# Patient Record
Sex: FEMALE | Race: ASIAN
[De-identification: names, ages, dates, MRNs, and addresses within clinical notes are randomized per-mention and may not be internally consistent; named-entity substitution may affect disease eponyms.]

---

## 2022-01-13 ENCOUNTER — APPOINTMENT (OUTPATIENT)
Dept: OBGYN | Facility: CLINIC | Age: 47
End: 2022-01-13
Payer: COMMERCIAL

## 2022-01-13 VITALS
SYSTOLIC BLOOD PRESSURE: 111 MMHG | BODY MASS INDEX: 33.72 KG/M2 | WEIGHT: 209.8 LBS | HEIGHT: 66 IN | HEART RATE: 78 BPM | DIASTOLIC BLOOD PRESSURE: 74 MMHG

## 2022-01-13 DIAGNOSIS — Z01.419 ENCOUNTER FOR GYNECOLOGICAL EXAMINATION (GENERAL) (ROUTINE) W/OUT ABNORMAL FINDINGS: ICD-10-CM

## 2022-01-13 PROBLEM — Z00.00 ENCOUNTER FOR PREVENTIVE HEALTH EXAMINATION: Status: ACTIVE | Noted: 2022-01-13

## 2022-01-13 PROCEDURE — 99386 PREV VISIT NEW AGE 40-64: CPT

## 2022-01-20 ENCOUNTER — APPOINTMENT (OUTPATIENT)
Dept: OBGYN | Facility: CLINIC | Age: 47
End: 2022-01-20

## 2022-07-29 ENCOUNTER — APPOINTMENT (OUTPATIENT)
Dept: GASTROENTEROLOGY | Facility: CLINIC | Age: 47
End: 2022-07-29

## 2022-07-29 VITALS
OXYGEN SATURATION: 98 % | WEIGHT: 196 LBS | BODY MASS INDEX: 31.5 KG/M2 | DIASTOLIC BLOOD PRESSURE: 75 MMHG | SYSTOLIC BLOOD PRESSURE: 112 MMHG | HEIGHT: 66 IN | HEART RATE: 66 BPM

## 2022-07-29 DIAGNOSIS — Z86.39 PERSONAL HISTORY OF OTHER ENDOCRINE, NUTRITIONAL AND METABOLIC DISEASE: ICD-10-CM

## 2022-07-29 PROCEDURE — 99204 OFFICE O/P NEW MOD 45 MIN: CPT

## 2022-07-29 NOTE — ASSESSMENT
[FreeTextEntry1] : 46 yo female with past medical hx of iron deficiency anemia presents today with reflux symptoms and bloating.\par \par \par Plan \par Problem 1: GERD \par - pt has hx of dyspepsia/GERD \par - takes Nexium at home \par - recent endoscopy in [place] showed mild gastritis \par - will repeat endoscopy \par - will perform H. pylori screening \par - will switch patient to Pepcid \par - discontinue Nexium \par \par Problem 2: Bloating/Abdominal discomfort \par - pt feels as though she has been experiencing increased bloating \par - no prior colonoscopy performed \par - will perform colonoscopy \par - Pre-op procedure discussed with patient \par - Risks and benefits of procedure discussed with patient \par \par written by Mallory Roper (PGY1 FM Resident) \par \par Reviewed and reconciled medications, allergies, PMHx, PSHx, SocHx, FMHx. Reviewed imaging, blood work, diagnostic testing, discussed with patient. Further recommendations pending results of above work up and evaluation. Patient seen and examined with Dr Roper. Plan devised and discussed with her, patient and family. \par

## 2022-07-29 NOTE — HISTORY OF PRESENT ILLNESS
[de-identified] : 48 yo female presenting today as a new pt. Recently immigrated from \par [place] and desires to establish care. She has had hx of acid reflux which does not seem to be resolving along with feelings of bloating. Pt states that the reflux is largely brought on by protein, fatty, and spicy foods. Takes Nexium at home which helps to alleviate the symptoms. Pt had an endoscopy performed in [place] which showed mild gastritis. Admits occasional loose bowel movements. Denies nausea, vomiting, constipation, melena. \par SDR with CRC (paternal uncles) and FDR with ulcers

## 2022-07-29 NOTE — REVIEW OF SYSTEMS
[Heartburn] : heartburn [Negative] : Heme/Lymph [Abdominal Pain] : no abdominal pain [Vomiting] : no vomiting [Constipation] : no constipation [Diarrhea] : no diarrhea [Melena] : no melena [FreeTextEntry7] : occasional heart burn

## 2022-07-29 NOTE — PHYSICAL EXAM
[General Appearance - Alert] : alert [General Appearance - Well Nourished] : well nourished [General Appearance - Well-Appearing] : healthy appearing [Bowel Sounds] : normal bowel sounds [Abdomen Soft] : soft [Abdomen Tenderness] : non-tender [Abdomen Mass (___ Cm)] : no abdominal mass palpated [Abnormal Walk] : normal gait [Nail Clubbing] : no clubbing  or cyanosis of the fingernails [Musculoskeletal - Swelling] : no joint swelling seen [Motor Tone] : muscle strength and tone were normal [Skin Color & Pigmentation] : normal skin color and pigmentation [Skin Turgor] : normal skin turgor [] : no rash

## 2022-07-29 NOTE — REASON FOR VISIT
[Initial Evaluation] : an initial evaluation [Spouse] : spouse [FreeTextEntry1] : Pt has been having reflux symptoms and bloating. abdominal pain/ burning, dyspepsia. fecal urgency

## 2022-08-10 ENCOUNTER — APPOINTMENT (OUTPATIENT)
Dept: GASTROENTEROLOGY | Facility: AMBULATORY MEDICAL SERVICES | Age: 47
End: 2022-08-10

## 2022-10-24 ENCOUNTER — APPOINTMENT (OUTPATIENT)
Dept: GASTROENTEROLOGY | Facility: CLINIC | Age: 47
End: 2022-10-24

## 2022-10-24 VITALS
HEART RATE: 66 BPM | SYSTOLIC BLOOD PRESSURE: 118 MMHG | WEIGHT: 182 LBS | HEIGHT: 66 IN | DIASTOLIC BLOOD PRESSURE: 79 MMHG | OXYGEN SATURATION: 97 % | BODY MASS INDEX: 29.25 KG/M2

## 2022-10-24 PROCEDURE — 99215 OFFICE O/P EST HI 40 MIN: CPT

## 2022-10-25 NOTE — REASON FOR VISIT
[Spouse] : spouse [FreeTextEntry1] : Pt has been having reflux symptoms and bloating. abdominal pain/ burning, dyspepsia. fecal urgency

## 2022-10-25 NOTE — HISTORY OF PRESENT ILLNESS
[de-identified] : interval history: no changes in symptoms. she has significantly limited her diet and food choices. reports that her limited diet is difficult and contributing to her feeling weak/ low enegery. \par NPA 7/29/22: 48 yo female presenting today as a new pt. Recently immigrated from \par [facility] and desires to establish care. She has had hx of acid reflux which does not seem to be resolving along with feelings of bloating. Pt states that the reflux is largely brought on by protein, fatty, and spicy foods. Takes Nexium at home which helps to alleviate the symptoms. Pt had an endoscopy performed in [place] which showed mild gastritis. Admits occasional loose bowel movements. Denies nausea, vomiting, constipation, melena. \par SDR with CRC (paternal uncles) and FDR with ulcers

## 2022-10-25 NOTE — CONSULT LETTER
[Dear  ___] : Dear  [unfilled], [Courtesy Letter:] : I had the pleasure of seeing your patient, [unfilled], in my office today. [Consult Closing:] : Thank you very much for allowing me to participate in the care of this patient.  If you have any questions, please do not hesitate to contact me. [Sincerely,] : Sincerely, [FreeTextEntry1] : 46 yo female with iron deficiency anemia following up today for abdominal pain, reflux symptoms and bloating.\par She will have EGD and colonoscopy to further evaluate. I explained to the patient the risks, alternatives and benefits to a EGD/ colonoscopy. Risk including but not limited to bleeding, perforation, infection adverse medication reaction. Questions were answered. agreeable to proceed with the planned procedures. \par \par \par \par Reviewed and reconciled medications, allergies, PMHx, PSHx, SocHx, FMHx. Reviewed imaging, blood work, diagnostic testing, discussed with patient. Further recommendations pending results of above work up and evaluation. Patient seen and examined with Dr Roper. Plan devised and discussed with her, patient and family. \par  [FreeTextEntry3] : Evangelina Horton MD\par Gastroenterology, Hepatology and Motility\par

## 2022-10-25 NOTE — ASSESSMENT
[FreeTextEntry1] : 48 yo female with iron deficiency anemia following up today for abdominal pain, reflux symptoms and bloating.\par She will have EGD and colonoscopy to further evaluate. I explained to the patient the risks, alternatives and benefits to a EGD/ colonoscopy. Risk including but not limited to bleeding, perforation, infection adverse medication reaction. Questions were answered. agreeable to proceed with the planned procedures. \par \par \par \par Reviewed and reconciled medications, allergies, PMHx, PSHx, SocHx, FMHx. Reviewed imaging, blood work, diagnostic testing, discussed with patient. Further recommendations pending results of above work up and evaluation. Patient seen and examined with Dr Roper. Plan devised and discussed with her, patient and family. \par

## 2022-10-28 ENCOUNTER — LABORATORY RESULT (OUTPATIENT)
Age: 47
End: 2022-10-28

## 2022-11-01 ENCOUNTER — APPOINTMENT (OUTPATIENT)
Dept: GASTROENTEROLOGY | Facility: AMBULATORY MEDICAL SERVICES | Age: 47
End: 2022-11-01

## 2022-11-01 ENCOUNTER — RESULT REVIEW (OUTPATIENT)
Age: 47
End: 2022-11-01

## 2022-11-01 PROCEDURE — 45380 COLONOSCOPY AND BIOPSY: CPT

## 2022-11-01 PROCEDURE — 43239 EGD BIOPSY SINGLE/MULTIPLE: CPT | Mod: 59

## 2022-11-03 ENCOUNTER — RX RENEWAL (OUTPATIENT)
Age: 47
End: 2022-11-03

## 2022-11-04 RX ORDER — BISMUTH SUBCITRATE POTASSIUM, METRONIDAZOLE, AND TETRACYCLINE HYDROCHLORIDE 140; 125; 125 MG/1; MG/1; MG/1
140-125-125 CAPSULE ORAL
Qty: 54 | Refills: 0 | Status: ACTIVE | COMMUNITY
Start: 2022-11-04 | End: 1900-01-01

## 2022-11-14 ENCOUNTER — NON-APPOINTMENT (OUTPATIENT)
Age: 47
End: 2022-11-14

## 2022-11-14 ENCOUNTER — APPOINTMENT (OUTPATIENT)
Dept: GASTROENTEROLOGY | Facility: CLINIC | Age: 47
End: 2022-11-14

## 2022-11-14 VITALS
HEIGHT: 66 IN | BODY MASS INDEX: 30.05 KG/M2 | DIASTOLIC BLOOD PRESSURE: 71 MMHG | SYSTOLIC BLOOD PRESSURE: 104 MMHG | OXYGEN SATURATION: 99 % | WEIGHT: 187 LBS

## 2022-11-14 PROCEDURE — 99215 OFFICE O/P EST HI 40 MIN: CPT

## 2022-11-14 NOTE — REASON FOR VISIT
[Follow-up] : a follow-up of an existing diagnosis [FreeTextEntry1] : weight loss, inability to tolerate meals, abdominal pain, fecal urgency. ZABRINA

## 2022-11-14 NOTE — CONSULT LETTER
[Dear  ___] : Dear  [unfilled], [Courtesy Letter:] : I had the pleasure of seeing your patient, [unfilled], in my office today. [Consult Closing:] : Thank you very much for allowing me to participate in the care of this patient.  If you have any questions, please do not hesitate to contact me. [Sincerely,] : Sincerely, [FreeTextEntry1] : We discussed in detail with patient and her family diagnosis of H pylori causing gastric ulceration and the likely etiology of her upper GI symptoms. WE also discussed the potential complications associated with active infection including PUD/ ulcer complications in addition to the association with malignancy. She will take Pylera which was prescribed. R/B/A discussed and questions answered. \par \par also with ulceration of more distal GI tract. concern for AID/ IBD, effects of NSAIDs or infection. will obtain further testing including MRE and inflammatory markers, based on results, capsule endoscopy. \par \par Reviewed and reconciled medications, allergies, PMHx, PSHx, SocHx, FMHx. Reviewed imaging, blood work, diagnostic testing, discussed with patient. Further recommendations pending results of above work up and evaluation.\par \par \par She will follow up after treatment compete. \par  [FreeTextEntry3] : Evangelina Horton MD\par Gastroenterology, Hepatology and Motility\par

## 2022-11-14 NOTE — ASSESSMENT
[FreeTextEntry1] : We discussed in detail with patient and her family diagnosis of H pylori causing gastric ulceration and the likely etiology of her upper GI symptoms. WE also discussed the potential complications associated with active infection including PUD/ ulcer complications in addition to the association with malignancy. She will take Pylera which was prescribed. R/B/A discussed and questions answered. \par \par also with ulceration of more distal GI tract. concern for AID/ IBD, effects of NSAIDs or infection. will obtain further testing including MRE and inflammatory markers, based o nresults, capsule endoscopy. \par \par Reviewed and reconciled medications, allergies, PMHx, PSHx, SocHx, FMHx. Reviewed imaging, blood work, diagnostic testing, discussed with patient. Further recommendations pending results of above work up and evaluation.\par \par \par She will follow up after treatment complte. \par

## 2022-11-14 NOTE — HISTORY OF PRESENT ILLNESS
[FreeTextEntry1] : gastric biopsies showed h pylori infection. also with inflammation of TI and right colon. \par interval history: no changes in symptoms. she has significantly limited her diet and food choices. reports that her limited diet is difficult and contributing to her feeling weak/ low enegery. \par NPA 7/29/22: 48 yo female presenting today as a new pt. Recently immigrated from \par [place] and desires to establish care. She has had hx of acid reflux which does not seem to be resolving along with feelings of bloating. Pt states that the reflux is largely brought on by protein, fatty, and spicy foods. Takes Nexium at home which helps to alleviate the symptoms. Pt had an endoscopy performed in [place] which showed mild gastritis. Admits occasional loose bowel movements. Denies nausea, vomiting, constipation, melena. \par SDR with CRC (paternal uncles) and FDR with ulcers.

## 2022-11-14 NOTE — PHYSICAL EXAM
[Sclera] : the sclera and conjunctiva were normal [No Respiratory Distress] : no respiratory distress [Abdomen Soft] : soft

## 2022-11-18 RX ORDER — OMEPRAZOLE 20 MG/1
20 TABLET, DELAYED RELEASE ORAL
Qty: 28 | Refills: 0 | Status: ACTIVE | COMMUNITY
Start: 2022-11-18 | End: 1900-01-01

## 2022-12-19 ENCOUNTER — APPOINTMENT (OUTPATIENT)
Dept: GASTROENTEROLOGY | Facility: CLINIC | Age: 47
End: 2022-12-19

## 2022-12-19 PROCEDURE — 99214 OFFICE O/P EST MOD 30 MIN: CPT

## 2022-12-20 NOTE — REASON FOR VISIT
[Follow-up] : a follow-up of an existing diagnosis [Spouse] : spouse [FreeTextEntry1] : weight loss, inability to tolerate meals, abdominal pain, fecal urgency. ZABRINA

## 2022-12-20 NOTE — CONSULT LETTER
[Dear  ___] : Dear  [unfilled], [Courtesy Letter:] : I had the pleasure of seeing your patient, [unfilled], in my office today. [Consult Closing:] : Thank you very much for allowing me to participate in the care of this patient.  If you have any questions, please do not hesitate to contact me. [Sincerely,] : Sincerely, [FreeTextEntry1] : Adelina is a very pleasant 47-year-old female who presents for follow-up today along with her  and her daughter.  She has completed the Pylera and despite having a lot of difficulty with the medication in particular nausea and poor appetite, she now is starting to feel better.  Prior to starting the Pylera and being diagnosed with H. pylori, she had significant abdominal pain and was eating very little (limited choices of food).  This has improved.\par We discussed in detail with patient and her family diagnosis of H pylori causing gastric ulceration and the likely etiology of her upper GI symptoms. WE also discussed the potential complications associated with chronic infection including PUD/ ulcer complications in addition to the association with malignancy. Patient completed taking Pylera. she will need confirmation of eradication. advised to avoid PPI, antibiotics and PeptoBismol for 2 weeks prior to UBT. \par \par also with ulceration of more distal GI tract. concern for AID/ IBD, effects of NSAIDs or infection. will obtain further testing including MRE and inflammatory markers, based o nresults, capsule endoscopy. \par \par Reviewed and reconciled medications, allergies, PMHx, PSHx, SocHx, FMHx. Reviewed imaging, blood work, diagnostic testing, discussed with patient. Further recommendations pending results of above work up and evaluation.\par  [FreeTextEntry3] : Evangelina Horton MD\par Gastroenterology, Hepatology and Motility\par

## 2022-12-20 NOTE — ASSESSMENT
[FreeTextEntry1] : Adelina is a very pleasant 47-year-old female who presents for follow-up today along with her  and her daughter.  She has completed the Pylera and despite having a lot of difficulty with the medication in particular nausea and poor appetite, she now is starting to feel better.  Prior to starting the Pylera and being diagnosed with H. pylori, she had significant abdominal pain and was eating very little (limited choices of food).  This has improved.\par We discussed in detail with patient and her family diagnosis of H pylori causing gastric ulceration and the likely etiology of her upper GI symptoms. WE also discussed the potential complications associated with chronic infection including PUD/ ulcer complications in addition to the association with malignancy. Patient completed taking Pylera. she will need confirmation of eradication. advised to avoid PPI, antibiotics and PeptoBismol for 2 weeks prior to UBT. \par \par also with ulceration of more distal GI tract. concern for AID/ IBD, effects of NSAIDs or infection. will obtain further testing including MRE and inflammatory markers, based o nresults, capsule endoscopy. \par \par Reviewed and reconciled medications, allergies, PMHx, PSHx, SocHx, FMHx. Reviewed imaging, blood work, diagnostic testing, discussed with patient. Further recommendations pending results of above work up and evaluation.\par \par \par \par

## 2022-12-20 NOTE — PHYSICAL EXAM
[Sclera] : the sclera and conjunctiva were normal [No Respiratory Distress] : no respiratory distress [Abdomen Soft] : soft [Abdomen Tenderness] : non-tender [No Masses] : no abdominal mass palpated

## 2022-12-20 NOTE — HISTORY OF PRESENT ILLNESS
[FreeTextEntry1] : gastric biopsies showed h pylori infection. also with inflammation of TI and right colon. \par interval history: no changes in symptoms. she has significantly limited her diet and food choices. reports that her limited diet is difficult and contributing to her feeling weak/ low energy. \par NPA 7/29/22: 48 yo female presenting today as a new pt. Recently immigrated from \par [place] and desires to establish care. She has had hx of acid reflux which does not seem to be resolving along with feelings of bloating. Pt states that the reflux is largely brought on by protein, fatty, and spicy foods. Takes Nexium at home which helps to alleviate the symptoms. Pt had an endoscopy performed in [place] which showed mild gastritis. Admits occasional loose bowel movements. Denies nausea, vomiting, constipation, melena. \par SDR with CRC (paternal uncles) and FDR with ulcers. \par \par Reports today that her upper GI symptoms are much better and she felt the worst while she was actually taking the Pylera. She has been able to eat more. She has had ZABRINA, menstrual periods normal.

## 2023-04-03 ENCOUNTER — APPOINTMENT (OUTPATIENT)
Dept: ORTHOPEDIC SURGERY | Facility: CLINIC | Age: 48
End: 2023-04-03
Payer: COMMERCIAL

## 2023-04-03 PROCEDURE — 73630 X-RAY EXAM OF FOOT: CPT | Mod: LT

## 2023-04-03 PROCEDURE — 99203 OFFICE O/P NEW LOW 30 MIN: CPT

## 2023-04-03 RX ORDER — DICLOFENAC SODIUM 1% 10 MG/G
1 GEL TOPICAL
Qty: 1 | Refills: 2 | Status: ACTIVE | COMMUNITY
Start: 2023-04-03 | End: 1900-01-01

## 2023-05-22 ENCOUNTER — APPOINTMENT (OUTPATIENT)
Dept: ORTHOPEDIC SURGERY | Facility: CLINIC | Age: 48
End: 2023-05-22
Payer: COMMERCIAL

## 2023-05-22 VITALS
TEMPERATURE: 97.7 F | HEART RATE: 83 BPM | DIASTOLIC BLOOD PRESSURE: 67 MMHG | SYSTOLIC BLOOD PRESSURE: 107 MMHG | HEIGHT: 66 IN | BODY MASS INDEX: 28.93 KG/M2 | WEIGHT: 180 LBS | OXYGEN SATURATION: 99 %

## 2023-05-22 DIAGNOSIS — M21.41 FLAT FOOT [PES PLANUS] (ACQUIRED), RIGHT FOOT: ICD-10-CM

## 2023-05-22 DIAGNOSIS — L84 CORNS AND CALLOSITIES: ICD-10-CM

## 2023-05-22 DIAGNOSIS — M62.89 OTHER SPECIFIED DISORDERS OF MUSCLE: ICD-10-CM

## 2023-05-22 DIAGNOSIS — M21.42 FLAT FOOT [PES PLANUS] (ACQUIRED), RIGHT FOOT: ICD-10-CM

## 2023-05-22 PROCEDURE — 99213 OFFICE O/P EST LOW 20 MIN: CPT

## 2023-05-23 PROBLEM — M62.89 TIGHTNESS OF BOTH GASTROCNEMIUS MUSCLES: Status: ACTIVE | Noted: 2023-04-29

## 2023-05-23 PROBLEM — M21.41 ACQUIRED PES PLANUS OF BOTH FEET: Status: ACTIVE | Noted: 2023-04-29

## 2023-05-23 PROBLEM — L84 SKIN CALLUS: Status: ACTIVE | Noted: 2023-04-29

## 2023-06-23 ENCOUNTER — APPOINTMENT (OUTPATIENT)
Dept: GASTROENTEROLOGY | Facility: CLINIC | Age: 48
End: 2023-06-23
Payer: COMMERCIAL

## 2023-06-23 VITALS
HEART RATE: 68 BPM | DIASTOLIC BLOOD PRESSURE: 69 MMHG | SYSTOLIC BLOOD PRESSURE: 107 MMHG | OXYGEN SATURATION: 97 % | WEIGHT: 184 LBS | HEIGHT: 66 IN | BODY MASS INDEX: 29.57 KG/M2

## 2023-06-23 DIAGNOSIS — K27.9 PEPTIC ULCER, SITE UNSPECIFIED, UNSPECIFIED AS ACUTE OR CHRONIC, W/OUT HEMORRHAGE OR PERFORATION: ICD-10-CM

## 2023-06-23 DIAGNOSIS — M20.21 HALLUX RIGIDUS, RIGHT FOOT: ICD-10-CM

## 2023-06-23 DIAGNOSIS — D64.9 ANEMIA, UNSPECIFIED: ICD-10-CM

## 2023-06-23 DIAGNOSIS — R14.0 ABDOMINAL DISTENSION (GASEOUS): ICD-10-CM

## 2023-06-23 DIAGNOSIS — D50.0 IRON DEFICIENCY ANEMIA SECONDARY TO BLOOD LOSS (CHRONIC): ICD-10-CM

## 2023-06-23 DIAGNOSIS — K52.9 NONINFECTIVE GASTROENTERITIS AND COLITIS, UNSPECIFIED: ICD-10-CM

## 2023-06-23 DIAGNOSIS — B96.81 PEPTIC ULCER, SITE UNSPECIFIED, UNSPECIFIED AS ACUTE OR CHRONIC, W/OUT HEMORRHAGE OR PERFORATION: ICD-10-CM

## 2023-06-23 DIAGNOSIS — R13.10 DYSPHAGIA, UNSPECIFIED: ICD-10-CM

## 2023-06-23 DIAGNOSIS — R10.13 EPIGASTRIC PAIN: ICD-10-CM

## 2023-06-23 PROCEDURE — 99215 OFFICE O/P EST HI 40 MIN: CPT

## 2023-06-23 RX ORDER — SODIUM SULFATE, MAGNESIUM SULFATE, AND POTASSIUM CHLORIDE 17.75; 2.7; 2.25 G/1; G/1; G/1
1479-225-188 TABLET ORAL
Qty: 1 | Refills: 0 | Status: DISCONTINUED | COMMUNITY
Start: 2022-07-29 | End: 2023-06-23

## 2023-06-24 LAB
CRP SERPL-MCNC: <3 MG/L
ERYTHROCYTE [SEDIMENTATION RATE] IN BLOOD BY WESTERGREN METHOD: 8 MM/HR
FOLATE SERPL-MCNC: 13.2 NG/ML
VIT B12 SERPL-MCNC: 432 PG/ML

## 2023-07-02 ENCOUNTER — NON-APPOINTMENT (OUTPATIENT)
Age: 48
End: 2023-07-02

## 2023-07-12 LAB
ANTI-A4 FLA2 IGG ELISA: 11.8 EU/ML
ANTI-CBIR1 ELISA: 19 EU/ML
ANTI-FLAX IGG ELISA: 10 EU/ML
ANTI-OMPC IGA ELISA: < 3.1 EU/ML
ASCA IGA ELISA: < 3.1 EU/ML
ASCA IGG ELISA: < 3.1 EU/ML
ATG16L1 SNP (RS2241880): NORMAL
CRP PROMTHEUS: 1.9 MG/L
ECM1 SNP (RS3737240): NORMAL
IBD AB 7 PNL SER: NORMAL
IBD-SPECIFIC PANCA IFA PATTERN DNASE SENSITIVITY: NOT DETECTED
IBD-SPECIFIC PANCA IFA PERINUCLEAR PATTERN: NOT DETECTED
ICAM-1 PROMETHEUS: 0.45 UG/ML
NKX2-3 SNP (RS10883365): NORMAL
PROMETHEUS LABORATORY FOOTER: NORMAL
SAA PROMETHEUS: 3.1 MG/L
STAT3 SNP (RS744166): NORMAL
VCAM-1 PROMETHEUS: 0.65 UG/ML
VEGF PROMETHEUS: 40 PG/ML

## 2023-08-21 ENCOUNTER — RX RENEWAL (OUTPATIENT)
Age: 48
End: 2023-08-21

## 2023-08-21 RX ORDER — FAMOTIDINE 20 MG/1
20 TABLET, FILM COATED ORAL TWICE DAILY
Qty: 180 | Refills: 2 | Status: ACTIVE | COMMUNITY
Start: 2022-07-29 | End: 1900-01-01

## 2024-02-27 NOTE — REVIEW OF SYSTEMS
"[facility] Daily Progress Note    Denisse Abel is a 67 y.o. female admitted 2/23/2024 with acute abdomen and sepsis due to perforated duodenal ulcer she was taken to the OR immediately and underwent repair of the duodenal ulcer. She originally presented for D/S HF w/ EF 40% and Diastolic Dysfunction Grade II.       2/23 Sx Perforated 1cm duodenal ulcer repair w/ sharlene patch & drain.   2/24 Stable. Mild abdominal pain. - BM. NPO. NG tube in place.   Afebrile. Vitals stable. Labs stable minus Na @ 123, BNP @ 2900.   2/25  Stable, overall doing better. Improvement of abd pain.     2/26 PM Rounds Update  A&Ox4. Stable. Overall doing well.   NGT putting out dark bile. D/Cd NGT in AM and began clear diet; tolerating.   Reporting nausea this AM. - emesis. Improved this PM.  Afebrile. Vitals stable. Labs stable minus RBC/Hg/Hct decreased (3.59, 10, 31.1). Na @ 134 (improved from 128). BUN 39 (improved from 56)  Upper GI series completed today. Results: \"There is no evidence of contrast leak from the stomach or duodenum.  No obstructing or constricting lesion. IMPRESSION: No evidence of contrast leak from the surgical repair site.\"    Plan for today   Pt is adamant about D/C to home; Monitor and evaluate in AM.        Will continue to follow. Thank you for allowing me to participate in their care.     Josep Pacheco, Ph.D. ([facility])  [employer] [facility], MD/PhD Program   [facility] Team     Attending. Dr. Toni Jay        SmartTabs below:     Interval Problem Update  Nausea overnight no vomiting, dark bile out from NGT  Subsequent UGI w/o leak  Per Surgeon (Pierre) OK to DC NGT and start clears    I have discussed this patient's plan of care and discharge plan at IDT rounds today with Case Management, Nursing, Nursing leadership, and other members of the IDT team.    Consultants/Specialty  critical care and general surgery    Code Status  Full Code    Disposition  Medically Cleared  I " have placed the appropriate orders for post-discharge needs.    Review of Systems  Review of Systems   Constitutional:  Positive for weight loss.   Respiratory:  Negative for shortness of breath.    Cardiovascular:  Negative for chest pain.   Gastrointestinal:  Positive for abdominal pain and nausea. Negative for vomiting.   Neurological:  Positive for weakness.   Psychiatric/Behavioral:  The patient is not nervous/anxious.         Physical Exam  Temp:  [36.4 °C (97.5 °F)-37 °C (98.6 °F)] 36.6 °C (97.8 °F)  Pulse:  [] 97  Resp:  [12-17] 16  BP: (106-123)/(48-72) 123/72  SpO2:  [93 %-99 %] 99 %    Physical Exam  Vitals and nursing note reviewed.   Constitutional:       Comments: Very thin, almost cachectic with muscle wasting   HENT:      Head: Normocephalic and atraumatic.      Nose:      Comments: NG tube     Mouth/Throat:      Mouth: Mucous membranes are moist.   Eyes:      Extraocular Movements: Extraocular movements intact.      Pupils: Pupils are equal, round, and reactive to light.   Cardiovascular:      Rate and Rhythm: Normal rate and regular rhythm.   Pulmonary:      Effort: Pulmonary effort is normal.      Breath sounds: Normal breath sounds.   Abdominal:      General: Abdomen is flat. There is no distension.      Palpations: Abdomen is soft.      Tenderness: There is abdominal tenderness.   Musculoskeletal:      Right lower leg: No edema.      Left lower leg: No edema.      Comments: Atrophy   Skin:     Comments: Skin loose   Neurological:      General: No focal deficit present.      Mental Status: She is alert and oriented to person, place, and time.      Cranial Nerves: No cranial nerve deficit.   Psychiatric:      Comments: More calm today         Fluids    Intake/Output Summary (Last 24 hours) at 2/26/2024 1748  Last data filed at 2/26/2024 1320  Gross per 24 hour   Intake 0 ml   Output 3050 ml   Net -3050 ml       Laboratory  Recent Labs     02/26/24  0250   WBC 8.1   RBC 3.59*   HEMOGLOBIN  10.0*   HEMATOCRIT 31.1*   MCV 86.6   MCH 27.9   MCHC 32.2   RDW 85.1*   PLATELETCT 218   MPV 9.9     Recent Labs     02/24/24  0402 02/25/24  0512 02/26/24  0250   SODIUM 123* 128* 134*   POTASSIUM 4.7 4.4 3.9   CHLORIDE 90* 94* 100   CO2 20 21 21   GLUCOSE 126* 104* 80   BUN 67* 56* 39*   CREATININE 1.72* 1.53* 1.01   CALCIUM 9.2 9.9 9.4                     Imaging  DX-UPPER GI-SERIES WITH KUB   Final Result      No evidence of contrast leak from the surgical repair site.      DX-CHEST-LIMITED (1 VIEW)   Final Result      1.  Free intraperitoneal air consistent with perforation of hollow viscus      2.  Findings were discussed with JUAN PABLO SCHWARTZ on 2/23/2024 11:42 AM.      CT-RENAL COLIC EVALUATION(A/P W/O)   Final Result      1.  Free intraperitoneal air present consistent with perforation of a hollow viscus      2.  Specific site of perforation is not evident on CT      3.  Cirrhosis with portal hypertension      4.  Moderate amount of ascites      5.  Cholelithiasis            6.  Findings were discussed with JUAN PABLO SCHWARTZ on 2/23/2024 11:45 AM.                     [As Noted in HPI] : as noted in HPI

## 2024-06-12 ENCOUNTER — APPOINTMENT (OUTPATIENT)
Dept: GASTROENTEROLOGY | Facility: CLINIC | Age: 49
End: 2024-06-12

## 2024-08-19 ENCOUNTER — APPOINTMENT (OUTPATIENT)
Dept: GASTROENTEROLOGY | Facility: CLINIC | Age: 49
End: 2024-08-19

## 2024-09-11 ENCOUNTER — APPOINTMENT (OUTPATIENT)
Dept: GASTROENTEROLOGY | Facility: CLINIC | Age: 49
End: 2024-09-11
Payer: COMMERCIAL

## 2024-09-11 VITALS
HEIGHT: 66 IN | SYSTOLIC BLOOD PRESSURE: 122 MMHG | BODY MASS INDEX: 33.11 KG/M2 | DIASTOLIC BLOOD PRESSURE: 74 MMHG | TEMPERATURE: 97.8 F | WEIGHT: 206 LBS | OXYGEN SATURATION: 98 % | HEART RATE: 72 BPM

## 2024-09-11 DIAGNOSIS — R63.5 ABNORMAL WEIGHT GAIN: ICD-10-CM

## 2024-09-11 DIAGNOSIS — E66.9 OBESITY, UNSPECIFIED: ICD-10-CM

## 2024-09-11 DIAGNOSIS — B96.81 PEPTIC ULCER, SITE UNSPECIFIED, UNSPECIFIED AS ACUTE OR CHRONIC, W/OUT HEMORRHAGE OR PERFORATION: ICD-10-CM

## 2024-09-11 DIAGNOSIS — Z86.39 PERSONAL HISTORY OF OTHER ENDOCRINE, NUTRITIONAL AND METABOLIC DISEASE: ICD-10-CM

## 2024-09-11 DIAGNOSIS — D50.0 IRON DEFICIENCY ANEMIA SECONDARY TO BLOOD LOSS (CHRONIC): ICD-10-CM

## 2024-09-11 DIAGNOSIS — R11.0 NAUSEA: ICD-10-CM

## 2024-09-11 DIAGNOSIS — K27.9 PEPTIC ULCER, SITE UNSPECIFIED, UNSPECIFIED AS ACUTE OR CHRONIC, W/OUT HEMORRHAGE OR PERFORATION: ICD-10-CM

## 2024-09-11 DIAGNOSIS — R14.0 ABDOMINAL DISTENSION (GASEOUS): ICD-10-CM

## 2024-09-11 PROCEDURE — 99214 OFFICE O/P EST MOD 30 MIN: CPT

## 2024-09-11 NOTE — PHYSICAL EXAM
[Hearing Threshold Finger Rub Not [place]] : hearing was normal [Normal Appearance] : the appearance of the neck was normal [Normal] : heart rate was normal and rhythm regular, normal S1 and S2, no murmurs [None] : no edema [Bowel Sounds] : normal bowel sounds [Abdomen Tenderness] : non-tender [No Masses] : no abdominal mass palpated [Abdomen Soft] : soft [Oriented To Time, Place, And Person] : oriented to person, place, and time

## 2024-09-11 NOTE — PHYSICAL EXAM
[Hearing Threshold Finger Rub Not [place]] : hearing was normal [Normal Appearance] : the appearance of the neck was normal [Normal] : heart rate was normal and rhythm regular, normal S1 and S2, no murmurs [None] : no edema [Bowel Sounds] : normal bowel sounds [Abdomen Tenderness] : non-tender [No Masses] : no abdominal mass palpated [Abdomen Soft] : soft [Oriented To Time, Place, And Person] : oriented to person, place, and time

## 2024-09-13 NOTE — REASON FOR VISIT
[Follow-up] : a follow-up of an existing diagnosis [Spouse] : spouse [FreeTextEntry1] : nausea, abdominal bloating

## 2024-09-13 NOTE — HISTORY OF PRESENT ILLNESS
[de-identified] : 11/1/22, Dr. Horton: erosive gastritis (H. Pylori positive), ulcer in stomach body, hiatal hernia [FreeTextEntry1] : 11/1/22, Dr. Horton: friability and erosions in the colon, grade 2 internal and external hemorrhoids, ulcer in the TI (negative for dysplasia), lipoma in the ascending colon

## 2024-09-13 NOTE — ASSESSMENT
[FreeTextEntry1] : Pleasant 49-year-old woman with a FH of CRC (paternal uncles) Hx of H. Pylori ulcer, GERD (on Omeprazole 20 mg/ morning and on Pepcid 20 mg/night since ~1 month ago), anemia (receiving Iron infusions; last was 2 weeks ago; reports irregular periods; last was 2 months ago), and inflammation of R colon and TI presents for follow-up. Today, reports abdominal bloating and nausea since 2-3 months ago. Recommend that the pt trial the Low FODMAP diet, avoid snacking, decrease intake of simple carbohydrates and increase vegetable portions with each meal, start daily [employer] Milk of Magnesia, and complete the [employer] breath test for SIBO.   All questions answered Call with any questions or concerns Time spent before and after visit reviewing patient's chart

## 2024-09-13 NOTE — HISTORY OF PRESENT ILLNESS
[de-identified] : 11/1/22, Dr. Horton: erosive gastritis (H. Pylori positive), ulcer in stomach body, hiatal hernia [FreeTextEntry1] : 11/1/22, Dr. Horton: friability and erosions in the colon, grade 2 internal and external hemorrhoids, ulcer in the TI (negative for dysplasia), lipoma in the ascending colon

## 2024-09-13 NOTE — CONSULT LETTER
[Dear  ___] : Dear  [unfilled], [Courtesy Letter:] : I had the pleasure of seeing your patient, [unfilled], in my office today. [Please see my note below.] : Please see my note below. [Sincerely,] : Sincerely, [FreeTextEntry3] : Dr. Evangelina Horton

## 2024-11-14 ENCOUNTER — NON-APPOINTMENT (OUTPATIENT)
Age: 49
End: 2024-11-14

## 2024-11-14 ENCOUNTER — APPOINTMENT (OUTPATIENT)
Dept: INTERNAL MEDICINE | Facility: CLINIC | Age: 49
End: 2024-11-14
Payer: COMMERCIAL

## 2024-11-14 VITALS
HEART RATE: 58 BPM | WEIGHT: 205 LBS | DIASTOLIC BLOOD PRESSURE: 68 MMHG | RESPIRATION RATE: 14 BRPM | TEMPERATURE: 98 F | BODY MASS INDEX: 32.95 KG/M2 | SYSTOLIC BLOOD PRESSURE: 112 MMHG | OXYGEN SATURATION: 98 % | HEIGHT: 66 IN

## 2024-11-14 DIAGNOSIS — D50.0 IRON DEFICIENCY ANEMIA SECONDARY TO BLOOD LOSS (CHRONIC): ICD-10-CM

## 2024-11-14 DIAGNOSIS — M54.31 SCIATICA, RIGHT SIDE: ICD-10-CM

## 2024-11-14 DIAGNOSIS — M79.673 PAIN IN UNSPECIFIED FOOT: ICD-10-CM

## 2024-11-14 PROCEDURE — 99386 PREV VISIT NEW AGE 40-64: CPT

## 2024-11-20 DIAGNOSIS — E66.9 OBESITY, UNSPECIFIED: ICD-10-CM

## 2024-11-20 RX ORDER — SEMAGLUTIDE 0.25 MG/.5ML
0.25 INJECTION, SOLUTION SUBCUTANEOUS
Qty: 1 | Refills: 1 | Status: DISCONTINUED | COMMUNITY
Start: 2024-11-14 | End: 2024-11-20

## 2024-11-22 RX ORDER — TIRZEPATIDE 2.5 MG/.5ML
2.5 INJECTION, SOLUTION SUBCUTANEOUS
Qty: 1 | Refills: 1 | Status: ACTIVE | COMMUNITY
Start: 2024-11-22 | End: 1900-01-01

## 2024-11-22 RX ORDER — SEMAGLUTIDE 0.68 MG/ML
2 INJECTION, SOLUTION SUBCUTANEOUS
Qty: 1 | Refills: 2 | Status: DISCONTINUED | COMMUNITY
Start: 2024-11-20 | End: 2024-11-22